# Patient Record
Sex: FEMALE | Race: BLACK OR AFRICAN AMERICAN | NOT HISPANIC OR LATINO | URBAN - METROPOLITAN AREA
[De-identification: names, ages, dates, MRNs, and addresses within clinical notes are randomized per-mention and may not be internally consistent; named-entity substitution may affect disease eponyms.]

---

## 2018-11-16 ENCOUNTER — EMERGENCY (EMERGENCY)
Facility: HOSPITAL | Age: 33
LOS: 0 days | Discharge: HOME | End: 2018-11-16
Attending: EMERGENCY MEDICINE
Payer: COMMERCIAL

## 2018-11-16 VITALS
DIASTOLIC BLOOD PRESSURE: 65 MMHG | HEIGHT: 61 IN | WEIGHT: 149.91 LBS | HEART RATE: 62 BPM | OXYGEN SATURATION: 100 % | RESPIRATION RATE: 15 BRPM | SYSTOLIC BLOOD PRESSURE: 114 MMHG | TEMPERATURE: 98 F

## 2018-11-16 DIAGNOSIS — M79.10 MYALGIA, UNSPECIFIED SITE: ICD-10-CM
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
## 2018-11-16 PROCEDURE — 99283 EMERGENCY DEPT VISIT LOW MDM: CPT
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
## 2018-11-16 NOTE — ED ADULT NURSE NOTE - NSIMPLEMENTINTERV_GEN_ALL_ED
Implemented All Universal Safety Interventions:  [place] to call system. Call bell, personal items and telephone within reach. Instruct patient to call for assistance. Room bathroom lighting operational. Non-slip footwear when patient is off stretcher. Physically safe environment: no spills, clutter or unnecessary equipment. Stretcher in lowest position, wheels locked, appropriate side rails in place.
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
## 2019-10-07 ENCOUNTER — EMERGENCY (EMERGENCY)
Facility: HOSPITAL | Age: 34
LOS: 1 days | Discharge: HOME | End: 2019-10-07
Attending: STUDENT IN AN ORGANIZED HEALTH CARE EDUCATION/TRAINING PROGRAM
Payer: COMMERCIAL

## 2019-10-07 VITALS
RESPIRATION RATE: 16 BRPM | DIASTOLIC BLOOD PRESSURE: 80 MMHG | HEART RATE: 71 BPM | SYSTOLIC BLOOD PRESSURE: 105 MMHG | OXYGEN SATURATION: 100 %

## 2019-10-07 VITALS
RESPIRATION RATE: 17 BRPM | HEART RATE: 68 BPM | TEMPERATURE: 98 F | SYSTOLIC BLOOD PRESSURE: 114 MMHG | DIASTOLIC BLOOD PRESSURE: 58 MMHG | OXYGEN SATURATION: 100 %

## 2019-10-07 PROCEDURE — 73620 X-RAY EXAM OF FOOT: CPT | Mod: 26,LT

## 2019-10-07 PROCEDURE — 99285 EMERGENCY DEPT VISIT HI MDM: CPT

## 2019-10-07 RX ORDER — MORPHINE SULFATE 50 MG/1
4 CAPSULE, EXTENDED RELEASE ORAL ONCE
Refills: 0 | Status: DISCONTINUED | OUTPATIENT
Start: 2019-10-07 | End: 2019-10-07

## 2019-10-07 RX ORDER — COLCHICINE 0.6 MG
1.2 TABLET ORAL ONCE
Refills: 0 | Status: COMPLETED | OUTPATIENT
Start: 2019-10-07 | End: 2019-10-07

## 2019-10-07 RX ORDER — KETOROLAC TROMETHAMINE 30 MG/ML
30 SYRINGE (ML) INJECTION ONCE
Refills: 0 | Status: DISCONTINUED | OUTPATIENT
Start: 2019-10-07 | End: 2019-10-07

## 2019-10-07 RX ORDER — COLCHICINE 0.6 MG
1 TABLET ORAL
Qty: 20 | Refills: 0
Start: 2019-10-07 | End: 2019-10-16

## 2019-10-07 RX ORDER — MORPHINE SULFATE 50 MG/1
6 CAPSULE, EXTENDED RELEASE ORAL ONCE
Refills: 0 | Status: DISCONTINUED | OUTPATIENT
Start: 2019-10-07 | End: 2019-10-07

## 2019-10-07 RX ADMIN — MORPHINE SULFATE 4 MILLIGRAM(S): 50 CAPSULE, EXTENDED RELEASE ORAL at 11:07

## 2019-10-07 RX ADMIN — Medication 1.2 MILLIGRAM(S): at 11:07

## 2019-10-07 NOTE — ED PROVIDER NOTE - CLINICAL SUMMARY MEDICAL DECISION MAKING FREE TEXT BOX
35 y/o F presenting with signs and symptoms of acute gout flare. Plan for pain management, x-ray, and podiatry consult for possible infection or arthritis.

## 2019-10-07 NOTE — CONSULT NOTE ADULT - ASSESSMENT
35 yo F w/ acute gout attack LF 1st MPJ  - Pt seen and evaluated in ED  - Pain on LF 1st MPJ ROM and joint is edematous and erythematous   - Pt afebrile, no open lesions, low concern for infectious component   - LF negative   - Rec start colchicine  - If no improvement w/ medication will consider cortisone injection to joint  - Surgical shoe provided to patient due to painful ROM of LF 1st MPJ  - Pt to follow up with Dr Metcalf within one week of discharge Please call [phone] to make an appointment.   - D/w attending.

## 2019-10-07 NOTE — CONSULT NOTE ADULT - SUBJECTIVE AND OBJECTIVE BOX
Podiatry pager #: [place] Pager:  [phone] LI Pager: [phone]    Patient is a 34y old  Female who presents with a chief complaint of LF first MPJ pain     HPI:  35 y/o F with no significant PMHx presents to ED with L 1st toe pain since 2 days. Pt was in her usual state of health up until 2 days ago where she reports was sitting on the L foot and suddenly developed pain. Pt denies having any trauma, fever, prior history of similar pain, or history of gout.     PAST MEDICAL & SURGICAL HISTORY:  No pertinent past medical history  No significant past surgical history      MEDICATIONS  (STANDING):    MEDICATIONS  (PRN):      Allergies    No Known Allergies    Intolerances        VITALS:    Vital Signs Last 24 Hrs  T(C): 36.9 (07 Oct 2019 08:58), Max: 36.9 (07 Oct 2019 08:58)  T(F): 98.5 (07 Oct 2019 08:58), Max: 98.5 (07 Oct 2019 08:58)  HR: 71 (07 Oct 2019 11:03) (68 - 71)  BP: 105/80 (07 Oct 2019 11:03) (105/80 - 114/58)  BP(mean): --  RR: 16 (07 Oct 2019 11:03) (16 - 17)  SpO2: 100% (07 Oct 2019 11:03) (100% - 100%)    LABS:                CAPILLARY BLOOD GLUCOSE              LOWER EXTREMITY PHYSICAL EXAM:    Vascular: DP/PT 1/4, B/L, CFT <3 seconds B/L, Temperature gradient warm to cool, B/L.   Neuro: Epicritic sensation intact to the level of toes, B/L.  Musculoskeletal/Ortho: pain on palpation and ROM of 1st LF MPJ  Skin: No open lesions, erythema and edema noted surrounding LF 1st MPJ    RADIOLOGY & ADDITIONAL STUDIES:    < from: Xray Foot AP + Lateral, Left (10.07.19 @ 10:17) >    EXAM:  RAD FOOT 2 VIEWS LEFT        PROCEDURE DATE:  Oct  7 2019         INTERPRETATION:  TIME OF EXAM: October 7, 2019 at 10:12 AM    CLINICAL INFORMATION: Left great toe pain for 2 days.    TECHNIQUE:   AP, oblique and lateral views of the left foot    INTERPRETATION:     There is no acute fracture or dislocation. Small bony protuberance off of   the lateral aspect of the distal portion of the proximal phalanx of the   fifth toe probably an benign exostosis. Articulating surfaces and soft   tissues are normal.      COMPARISON:  None available      IMPRESSION:  No acute bone or joint disease.                  ALEXANDRIA CAMPBELL M.D., ATTENDING RADIOLOGIST  This document has been electronically signed. Oct  7 2019 11:28AM                  < end of copied text >

## 2019-10-07 NOTE — ED ADULT NURSE NOTE - OBJECTIVE STATEMENT
Pt complain of L foot pain 8/10 resting, 10/10 on activity. L foot swollen and painful to touch. Redness observed below L greater toe. No hx of DM or HTN. MD Williamson assessed and ordered Xray. Will continue to monitor. Pt A&Ox4, Pt complain of L foot pain 8/10 resting, 10/10 on activity. L foot swollen and painful to touch. Redness observed below L greater toe. No hx of DM or HTN. MD Williamson assessed and ordered Xray. Will continue to monitor.

## 2019-10-07 NOTE — ED PROVIDER NOTE - PATIENT PORTAL LINK FT
You can access the FollowMyHealth Patient Portal offered by [facility] by registering at the following website: [email or web address]. By joining [employer]’s FollowMyHealth portal, you will also be able to view your health information using other applications (apps) compatible with our system.

## 2019-10-07 NOTE — ED PROVIDER NOTE - PHYSICAL EXAMINATION
Msk: Pain with ROM of L great toe  Skin: L great toe swollen and erythematous at base, no streaking.

## 2019-10-07 NOTE — ED PROVIDER NOTE - PROGRESS NOTE DETAILS
Discussed patient with podiatry consult, no acute concern for infectious arthritis, more likely to be gout, Xray negative, plan for discharge with podiatry f/u and strict return precautions.

## 2019-11-06 NOTE — ED PROVIDER NOTE - OBJECTIVE STATEMENT
33 y/o F with no significant PMHx presents to ED with L 1st toe pain since 2 days. Pt was in her usual state of health up until 2 days ago where she reports was sitting on the L foot and suddenly developed pain. Pt denies having any trauma, fever, prior history of similar pain, or history of gout.   PMH/PSH: negative  FH/SH: non-contributory, except as noted in the HPI  Allergies: No known drug allergies  Medications: No chronic home medications Initial (On Arrival)